# Patient Record
(demographics unavailable — no encounter records)

---

## 2024-10-23 NOTE — ASSESSMENT
[FreeTextEntry1] : Mr. GIA BURROUGHS is a 47 year old man with PMHx of CAD on CCTA (CADRADS 3, ), elevated Lp(a) 200, HLD, pre-DM, NAFLD who is presenting for follow up  -Reviewed history, labs, ECG, CCTA (independently), ETT Echo (independently) -Had extensive discussion about CAD, including diagnosis, management, and prognosis -Continue ASA 81 daily -Reduce crestor to 10 daily for HLD -Start pralulent given uncontrolled HLD, elevated Lp(a), and mild myalgias on high dose statin. He will receive first injection at pharmacy, and then see if he needs further instruction on how to inject. If so, he will let us know.  -Order lipids, A1c -Order CHICHO/PVR to screen for PAD  -Encouraged improved lifestyle modifications with these recommendations below: -Plant-based and Mediterranean diets, along with increased fruit, nut, vegetable, legume, and lean vegetable or animal protein (preferably fish) consumption -Engage in at least 150 minutes per week of accumulated moderate-intensity aerobic physical activity or 75 minutes per week of vigorous-intensity aerobic physical activity  Time in encounter, including face to face visit and time reviewing chart:  34 minutes  Rashawn Rosenthal MD, FACC Non-Invasive Cardiology [address], [address] Office: [phone]

## 2024-10-23 NOTE — REASON FOR VISIT
[FreeTextEntry1] : Mr. GIA BURROUGHS is a 47 year old man with PMHx of CAD on CCTA (CADRADS 3, ), elevated Lp(a) 200, HLD, pre-DM, NAFLD who is presenting for follow up. He had a high calcified plaque burden. He is trying to exercise more. He has no chest pain, SOB or palpitations. He has no limitations with exertion. At last visit, his crestor was increased to 40. He has since has slight LLE pain. Unclear if this is myalgias.   He has FHx of CAD (father CABG in his 60s), DM (father).   ETT Echo CONCLUSIONS 1. No echocardiographic evidence of exercise induced ischemia. 2. No electrocardiographic evidence of ischemia at or near maximal predicted heart rate. 3. Negative for angina or the patient's characteristic chest pain. 4. Normal blood pressure response to exercise. 5. No significant exercise-induced arrhythmias. 6. Above average functional capacity. 7. Left ventricular cavity is normal in size. Left ventricular systolic function is normal with an ejection fraction of 58 % by Sanchez's method of disks. 8. Normal right ventricular cavity size and normal right ventricular systolic function. 9. No significant valve disease.  CCTA 6/12/24 IMPRESSION: Calcified and noncalcified plaque predominantly within the proximal LAD with indeterminate stenosis severity due to limited intraluminal evaluation although suspicious for at least moderate narrowing. Focal dense calcified plaque proximal LCx with indeterminate stenosis severity due to limited intraluminal evaluation although suspicious for at least moderate narrowing. Mixed dense calcified and noncalcified plaque within the proximal and distal RCA with indeterminate stenosis severity due to limited intraluminal evaluation although suspicious for at least moderate narrowing. The total Agatston coronary artery calcium score equals 823, which corresponds to 99th percentile for age, gender and ethnicity. CAD-RADS 3

## 2024-10-23 NOTE — ASSESSMENT
[FreeTextEntry1] : Mr. GIA BURROUGHS is a 47 year old man with PMHx of CAD on CCTA (CADRADS 3, ), elevated Lp(a) 200, HLD, pre-DM, NAFLD who is presenting for follow up  -Reviewed history, labs, ECG, CCTA (independently), ETT Echo (independently) -Had extensive discussion about CAD, including diagnosis, management, and prognosis -Continue ASA 81 daily -Reduce crestor to 10 daily for HLD -Start pralulent given uncontrolled HLD, elevated Lp(a), and mild myalgias on high dose statin. He will receive first injection at pharmacy, and then see if he needs further instruction on how to inject. If so, he will let us know.  -Order lipids, A1c -Order CHICHO/PVR to screen for PAD  -Encouraged improved lifestyle modifications with these recommendations below: -Plant-based and Mediterranean diets, along with increased fruit, nut, vegetable, legume, and lean vegetable or animal protein (preferably fish) consumption -Engage in at least 150 minutes per week of accumulated moderate-intensity aerobic physical activity or 75 minutes per week of vigorous-intensity aerobic physical activity  Time in encounter, including face to face visit and time reviewing chart:  34 minutes  Rashawn Rosenthal MD, FACC Non-Invasive Cardiology [address], [address] Office: [phone]

## 2024-12-19 NOTE — ASSESSMENT
[FreeTextEntry1] : Mr. GIA BURROUGHS is a 47 year old man with PMHx of CAD on CCTA (CADRADS 3, ), elevated Lp(a) 200, HLD, pre-DM, NAFLD who is presenting for follow up  -Reviewed history, labs, ECG, CCTA (independently), ETT Echo (independently) -Had extensive discussion about CAD, including diagnosis, management, and prognosis -Continue ASA 81 daily -Recommended to continue [id number] daily for HLD as well. Will see repeat labs first. -Continue pralulent given uncontrolled HLD, elevated Lp(a), and mild myalgias on high dose statin. He is tolerating injection well.  -Order lipids, A1c -CHICHO/PVR normal 11/10/24  -Encouraged improved lifestyle modifications with these recommendations below: -Plant-based and Mediterranean diets, along with increased fruit, nut, vegetable, legume, and lean vegetable or animal protein (preferably fish) consumption -Engage in at least 150 minutes per week of accumulated moderate-intensity aerobic physical activity or 75 minutes per week of vigorous-intensity aerobic physical activity  Time in encounter, including face to face visit and time reviewing chart:  34 minutes  Rashawn Rosenthal MD, FACC Non-Invasive Cardiology [address] Office: [phone]

## 2025-03-20 NOTE — ASSESSMENT
[FreeTextEntry1] : Mr. GIA BURROUGHS is a 48 year old man with PMHx of CAD on CCTA (CADRADS 3, ), elevated Lp(a) 200, HLD, pre-DM, NAFLD who is presenting for follow up.  -Reviewed history, labs, ECG, CCTA (independently), ETT Echo (independently) -Had extensive discussion about CAD, including diagnosis, management, and prognosis -Continue ASA 81 daily -Continue pralulent given uncontrolled HLD, elevated Lp(a), and mild myalgias on high dose statin. He is tolerating injection well.  -CHICHO/PVR normal 11/10/24 -Carotid US negative 7/2024  -Encouraged improved lifestyle modifications with these recommendations below: -Plant-based and Mediterranean diets, along with increased fruit, nut, vegetable, legume, and lean vegetable or animal protein (preferably fish) consumption -Engage in at least 150 minutes per week of accumulated moderate-intensity aerobic physical activity or 75 minutes per week of vigorous-intensity aerobic physical activity  Time in encounter, including face to face visit and time reviewing chart:  34 minutes  Rashawn Rosenthal MD, FACC Non-Invasive Cardiology [address] Office: [phone]

## 2025-03-20 NOTE — REASON FOR VISIT
[FreeTextEntry1] : Mr. GIA UBRROUGHS is a 48 year old man with PMHx of CAD on CCTA (CADRADS 3, ), elevated Lp(a) 200, HLD, pre-DM, NAFLD who is presenting for follow up. He had a high calcified plaque burden. He is exercising regularly without chest pain. He has no chest pain, SOB or palpitations. He has no limitations with exertion. He is on praulent and tolerating injections well.  He has FHx of CAD (father CABG in his 60s), DM (father).   ETT Echo CONCLUSIONS 1. No echocardiographic evidence of exercise induced ischemia. 2. No electrocardiographic evidence of ischemia at or near maximal predicted heart rate. 3. Negative for angina or the patient's characteristic chest pain. 4. Normal blood pressure response to exercise. 5. No significant exercise-induced arrhythmias. 6. Above average functional capacity. 7. Left ventricular cavity is normal in size. Left ventricular systolic function is normal with an ejection fraction of 58 % by Sanchez's method of disks. 8. Normal right ventricular cavity size and normal right ventricular systolic function. 9. No significant valve disease.  CCTA 6/12/24 IMPRESSION: Calcified and noncalcified plaque predominantly within the proximal LAD with indeterminate stenosis severity due to limited intraluminal evaluation although suspicious for at least moderate narrowing. Focal dense calcified plaque proximal LCx with indeterminate stenosis severity due to limited intraluminal evaluation although suspicious for at least moderate narrowing. Mixed dense calcified and noncalcified plaque within the proximal and distal RCA with indeterminate stenosis severity due to limited intraluminal evaluation although suspicious for at least moderate narrowing. The total Agatston coronary artery calcium score equals 823, which corresponds to 99th percentile for age, gender and ethnicity. CAD-RADS 3